# Patient Record
Sex: FEMALE | Race: WHITE
[De-identification: names, ages, dates, MRNs, and addresses within clinical notes are randomized per-mention and may not be internally consistent; named-entity substitution may affect disease eponyms.]

---

## 2017-11-15 ENCOUNTER — HOSPITAL ENCOUNTER (OUTPATIENT)
Dept: HOSPITAL 63 - MAMMO | Age: 62
Discharge: HOME | End: 2017-11-15
Payer: COMMERCIAL

## 2017-11-15 DIAGNOSIS — Z12.31: Primary | ICD-10-CM

## 2017-11-15 PROCEDURE — 77063 BREAST TOMOSYNTHESIS BI: CPT

## 2017-11-15 NOTE — RAD
DATE: 11/15/2017



EXAM: MAMMO DIDIER SCREENING BILATERAL Bilateral Screening Digital 2D and 3D

Mammogram



HISTORY: Screening Mammogram



COMPARISON: Screening mammogram 11/14/2016 11/11/2015 and 10/16/2014



This study was interpreted with the benefit of Computerized Aided Detection

(CAD).



The breast parenchyma shows scattered fibroglandular densities. Breast

parenchyma level B.



FINDINGS: Bilateral digital 2-D and 3-D tomosynthesis CC and MLO views. No

suspicious mass, calcification or architectural distortion. No significant

change from prior examination    



IMPRESSION: No mammographic evidence of malignancy. Recommend routine

screening mammogram in 12 months. 



BI-RADS CATEGORY: 1 NEGATIVE



RECOMMENDED FOLLOW-UP: 12M 12 MONTH FOLLOW-UP



PQRS compliance statement: Patient information was entered into a reminder

system with a target due date     for the next mammogram.



Mammography is a sensitive method for finding small breast cancers, but it

does not detect them all and is not a substitute for careful clinical

examination.  A negative mammogram does not negate a clinically suspicious

finding and should not result in delay in biopsying a clinically suspicious

abnormality.



"Our facility is accredited by the [employer] Mammography

Program."

## 2018-09-26 ENCOUNTER — HOSPITAL ENCOUNTER (OUTPATIENT)
Dept: HOSPITAL 61 - PNCL | Age: 63
Discharge: HOME | End: 2018-09-26
Attending: ANESTHESIOLOGY
Payer: COMMERCIAL

## 2018-09-26 DIAGNOSIS — M51.16: Primary | ICD-10-CM

## 2018-09-26 DIAGNOSIS — Z91.040: ICD-10-CM

## 2018-09-26 DIAGNOSIS — M47.816: ICD-10-CM

## 2018-09-26 DIAGNOSIS — M96.1: ICD-10-CM

## 2018-09-26 PROCEDURE — 62323 NJX INTERLAMINAR LMBR/SAC: CPT

## 2018-09-26 NOTE — PAIN
DATE OF SERVICE:  09/26/2018



DIAGNOSES:  Lumbar radiculopathy with lumbar spondylosis and post-lumbar

laminectomy syndrome.



HISTORY OF PRESENT ILLNESS:  The patient is a 62-year-old female who returns for

followup status post lumbar epidural steroid injections, last seen 10/2016.  The

patient did very well with her last injection, but reports the pain is returning

now in the low back and mainly in the right leg, posterior gluteus, posterior

thigh, posterior calf, worse with standing, walking, changing positions

described as stabbing, aching, burning, radiating, becoming more constant and

more severe and more unbearable.  The patient did have an MRI scan dated

07/30/2018 showing L5-S1 generalized disk bulge, slightly asymmetric to the

right and mild bilateral facet arthropathy and ligamentum flavum hypertrophy

without central stenosis.  L4-L5 shows generalized disk bulge and mild bilateral

facet arthropathy and ligamentum flavum hypertrophy as well with mild bilateral

neural foraminal stenosis.  The patient reports it is worse with walking,

standing, changing positions; awakens her from sleep about every 4 hours at

night.  The patient reports she can easily reposition and get back to sleep. 

She works no new motor or sensory deficits, no new bowel or bladder

incontinence, but still significant pain as described.



PHYSICAL EXAMINATION:

VITAL SIGNS:  The patient's blood pressure 146/95, pulse 81, respirations 20,

temperature is 98.2 degrees Fahrenheit, height is 5 feet 5 inches, weight is 156

pounds.

GENERAL:  She is awake, alert, oriented, appropriate, very pleasant demeanor.

HEENT:  Head shows normocephalic, atraumatic.  Extraocular muscles are intact

and symmetrical.  Oral cavity:  Mucous membranes moist and pink.  Dentition is

intact.

NECK:  Shows anterior throat supple without palpable lymphadenopathy noted. 

Swallow reflex is symmetrical.

CHEST:  Shows normal on inspection.  Breath sounds clear to auscultation

bilaterally.

HEART:  Shows S1, S2 clear.  No murmurs auscultated.

ABDOMEN:  Soft, nontender, nondistended.  No palpable organomegaly is noted.  No

rebound or guarding demonstrated.

BACK:  Shows spine grossly in the midline.  Normal appearing thoracic kyphosis

and some slight flattening of lumbar lordotic curvature.  Well-healed surgical

scar noted in the lumbar distribution.  Lumbar paraspinous musculature shows

symmetrical on inspection, on palpation shows some moderate tenderness

bilaterally but only diffusely without radiation.

EXTREMITIES:  The patient's lower extremities show deep tendon reflexes at 1+ in

the patellar and tendo calcaneus tendons.  Motor exam is strong with 5/5

dorsiflexion and extension, quadriceps and hamstring flexion equal bilaterally. 

Peripheral pulses are 1+ posterior tibial.  No peripheral edema is noted.



Options were discussed with the patient.  The patient's old chart was reviewed

as her current medication regimen and updated.  Current review of systems is

updated today as well.  We will proceed with a lumbar epidural steroid injection

today with fluoroscopic guidance.  Risks were again discussed including, but not

limited to bleeding, infection, possibility of epidural hematoma and subsequent

neurological compromise, dural punctures, headaches, spinal cord and/or nerve

damage, side effects of steroid medication and poor results regarding pain

control.  The patient understands and wished to proceed.  The patient to return

to clinic in approximately 2 weeks for followup.  She was counseled to return

appointment, activity level and side effects to be aware of.



DIAGNOSES:  Lumbar radiculopathy with lumbar spondylosis, lumbar

post-laminectomy syndrome and degenerative disk disease, lumbar.



PROCEDURE:  Lumbar epidural steroid injection, translaminar approach L5-S1 level

using C-arm fluoroscopic guidance under sterile prep and drape using local

anesthetic.



MEDICATION INJECTED:  A total of 120 mg of Depo-Medrol plus 10 mL of

preservative-free normal saline and 2 mL of Isovue for contrast.



CONDITION AT DISCHARGE:  Stable.  The patient tolerated procedure well, had no

complications.

 



______________________________

JERROD MILLER MD



DR:  ABDULLAHI/sharona  JOB#:  [id number] / [id number]

DD:  09/26/2018 09:06  DT:  09/26/2018 12:58

## 2018-10-10 ENCOUNTER — HOSPITAL ENCOUNTER (OUTPATIENT)
Dept: HOSPITAL 61 - PNCL | Age: 63
Discharge: HOME | End: 2018-10-10
Attending: ANESTHESIOLOGY
Payer: COMMERCIAL

## 2018-10-10 DIAGNOSIS — M96.1: ICD-10-CM

## 2018-10-10 DIAGNOSIS — M47.26: ICD-10-CM

## 2018-10-10 DIAGNOSIS — Z91.040: ICD-10-CM

## 2018-10-10 DIAGNOSIS — M51.16: Primary | ICD-10-CM

## 2018-10-10 PROCEDURE — 62323 NJX INTERLAMINAR LMBR/SAC: CPT

## 2018-10-10 NOTE — PAIN
DATE OF SERVICE:  10/10/2018



DIAGNOSES:  Lumbar radiculopathy with lumbar spondylosis and lumbar degenerative

disk disease and post-lumbar laminectomy syndrome.



HISTORY OF PRESENT ILLNESS:  The patient is a 63-year-old female who returns for

followup status post lumbar epidural steroid injection x 1.  The patient reports

about 50% improvement for the first week, and the pain has been returning

gradually in the low back, right greater than left lower extremity, posterior

gluteus, posterior thigh, radiating to the posterior lower leg occasionally,

worse with walking, standing, changing positions, better with sitting or lying

down, but awakens her from sleep occasionally, but only once about every 5 hours

or so.  Otherwise, she sleeps fairly well.  The patient reports pain is 8 on a

scale of 10 at its worst, average and least and is an 8 today.  Describes as

aching, sharp, shooting, stabbing, radiating, becoming more constant, more

severe, but again the first week doing much better, about 50%.  The patient has

increased activity, distance walking as well as doing household activities with

greater ease and comfort.



PHYSICAL EXAMINATION:

VITAL SIGNS:  Today, the patient's blood pressure 152/81, pulse is 64,

respirations 16, temperature 97.5 degrees Fahrenheit, height is 5 feet 5 inches

and weight is 154 pounds.

GENERAL:  The patient is awake, alert, oriented, appropriate, very pleasant

demeanor.

HEENT:  Head shows normocephalic, atraumatic.  Extraocular movements intact and

symmetrical.  Oral cavity:  Mucous membranes moist and pink.  Dentition is

intact.

NECK:  Shows anterior throat supple without palpable lymphadenopathy noted. 

Swallow reflex is symmetrical.

CHEST:  Shows normal with inspection.  Breath sounds clear to auscultation

bilaterally.

HEART:  Shows S1, S2 clear.  No murmurs auscultated.

ABDOMEN:  Soft, nontender, nondistended.  No palpable organomegaly is noted.  No

rebound or guarding demonstrated.

BACK:  Shows spine grossly in the midline.  Normal appearing thoracic kyphosis

and minor flattening of lumbar lordotic curvature.  Well-healed surgical scar

noted.  Lumbar distribution of the paraspinous muscle shows symmetrical on

inspection, on palpation shows some moderate tenderness but only diffusely

bilaterally without radiation.  No tenderness over the sacrum or sacroiliac

regions.  The patient has good rotational motion of the lumbar spine, both

laterally as well as extension and flexion without difficulty.

EXTREMITIES:  The patient's lower extremities show deep tendon reflexes 1+ in

the patellar and tendo calcaneus tendons are equal.  Motor exam is strong with

5/5 dorsiflexion, extension, quadriceps and hamstring flexion.  Peripheral

pulses are 1+ posterior tibia.  No peripheral edema is noted.



Options were discussed with the patient, the patient's old chart was reviewed as

her current medication regimen updated.  Current review of systems updated today

as well.  We will proceed with a second in the series of lumbar epidural steroid

injection today with fluoroscopic guidance.  Risks were again discussed

including, but not limited to bleeding, infection, possibility of epidural

hematoma, subsequent neurological compromise, dural puncture, headaches, spinal

cord and/or nerve damage, side effects of steroid medication and poor results

regarding pain control.  The patient understands and wished to proceed.  The

patient to return to clinic in approximately 2 weeks for followup, was counseled

on return appointment, activity level and side effects to be aware of.



DIAGNOSES:  Lumbar radiculopathy with lumbar spondylosis, lumbar degenerative

disk disease, post-lumbar laminectomy syndrome.



PROCEDURE:  Lumbar epidural steroid injection, translaminar approach at L5-S1

level using C-arm fluoroscopic guidance under sterile prep and drape using local

anesthetic.



MEDICATION INJECTED:  A total of 120 mg Depo-Medrol, plus 10 mL

preservative-free normal saline and 2 mL of Isovue for contrast.



CONDITION AT DISCHARGE:  Stable.  The patient tolerated procedure well, had no

complications.

 



______________________________

JERROD MILLER MD



DR:  ABDULLAHI/sharona  JOB#:  [id number] / [id number]

DD:  10/10/2018 08:52  DT:  10/10/2018 15:11

## 2018-10-24 ENCOUNTER — HOSPITAL ENCOUNTER (OUTPATIENT)
Dept: HOSPITAL 61 - PNCL | Age: 63
Discharge: HOME | End: 2018-10-24
Attending: ANESTHESIOLOGY
Payer: COMMERCIAL

## 2018-10-24 DIAGNOSIS — Z91.040: ICD-10-CM

## 2018-10-24 DIAGNOSIS — M96.1: ICD-10-CM

## 2018-10-24 DIAGNOSIS — M51.16: Primary | ICD-10-CM

## 2018-10-24 DIAGNOSIS — M47.26: ICD-10-CM

## 2018-10-24 PROCEDURE — 62323 NJX INTERLAMINAR LMBR/SAC: CPT

## 2018-10-25 NOTE — PAIN
DATE OF SERVICE:  10/24/2018



DIAGNOSES:  Lumbar radiculopathy with lumbar spondylosis, lumbar degenerative

disk disease and post-lumbar laminectomy syndrome.



HISTORY OF PRESENT ILLNESS:  The patient is a 63-year-old female who returns for

followup status post lumbar epidural steroid injection x 2.  The patient reports

about 70% improvement for the first week or so, but the pain returning in the

low back, bilateral lower extremities, mostly in the posterior gluteus,

posterior thighs, posterior calves, slightly worse on the right than the left

than it had been previously.  The patient reports it is an 8 on a scale of 10 at

its worst, on average and at its least and is an 8 today.  The patient reports

it is aching, sharp, shooting, stabbing, cramping, tingling, radiating, becoming

more constant, more severe, worse with walking, standing, change in positions,

awakens her from sleep about every 3-5 hours, but not every night.  The patient

reports no new motor or sensory deficits.  No new bowel or bladder incontinence

or other complaints.



PHYSICAL EXAMINATION:

VITAL SIGNS:  The patient's blood pressure is 144/102, pulse 81, respirations

16, temperature is 97.4 degrees Fahrenheit, height is 5 feet 5 inches, weight is

153 pounds.

GENERAL:  The patient is awake, alert, oriented, appropriate, very pleasant

demeanor.

HEENT:  Shows normocephalic, atraumatic.  Extraocular movements intact and

symmetrical.  Oral cavity:  Mucous membranes moist and pink.  Dentition is

intact.

NECK:  Shows anterior throat supple without palpable lymphadenopathy noted. 

Swallow reflex symmetrical.

CHEST:  Shows normal on inspection.  Breath sounds are clear to auscultation

bilaterally.

HEART:  Shows S1, S2 clear.  No murmurs auscultated.

ABDOMEN:  Soft, nontender, nondistended.  No palpable organomegaly is noted.  No

rebound or guarding demonstrated.

BACK:  Shows spine grossly in the midline.  Normal appearing thoracic kyphosis

and some minor flattening of lumbar lordotic curvature.  Lumbar paraspinous

muscle shows symmetrical on inspection.  With palpation shows some moderate

tenderness diffusely in the low lumbar distribution only.  The patient has

well-healed surgical scar in the lumbar spine _____ as well.

EXTREMITIES:  The patient's lower extremities show deep tendon reflexes about 1+

in the patellar and tendo calcaneus tendons.  Motor exam is strong with 5/5

dorsiflexion, extension, quadriceps and hamstring flexion and symmetrical. 

Peripheral pulses are 1+ posterior tibia.  No peripheral edema is noted.



Options were discussed with the patient.  The patient's old chart was reviewed

as is her current medication regimen updated.  Current review of systems is

updated today as well.  We will proceed with third in the series of lumbar

epidural steroid injection today with fluoroscopic guidance.  Risks were again

discussed including, but not limited to bleeding, infection, possibility of

epidural hematoma and subsequent neurological compromise, dural puncture,

headaches, spinal cord and/or nerve damage, side effects of steroid medication

and poor results regarding pain control.  The patient understands and wished to

proceed.  The patient will return to the clinic in approximately 2 weeks for

followup, was counseled on return appointment, activity level and side effects

to be aware of.



DIAGNOSES:  Lumbar radiculopathy with lumbar spondylosis, degenerative disk

disease and post-lumbar laminectomy syndrome.



PROCEDURE:  Lumbar epidural steroid injection, translaminar approach at L5-S1

using C-arm fluoroscopic guidance under sterile prep and drape using local

anesthetic.



MEDICATION INJECTED:  A total of 120 mg Depo-Medrol plus 10 mL of

preservative-free normal saline and 2 mL of Isovue for contrast.



CONDITION AT DISCHARGE:  Stable.  The patient tolerated procedure well, had no

complications.

 



______________________________

JERROD MILLER MD



DR:  ABDULLAHI/sharona  JOB#:  [id number] / [id number]

DD:  10/24/2018 09:50  DT:  10/25/2018 00:15

## 2018-11-14 ENCOUNTER — HOSPITAL ENCOUNTER (OUTPATIENT)
Dept: HOSPITAL 61 - PNCL | Age: 63
Discharge: HOME | End: 2018-11-14
Attending: ANESTHESIOLOGY
Payer: COMMERCIAL

## 2018-11-14 DIAGNOSIS — M96.1: ICD-10-CM

## 2018-11-14 DIAGNOSIS — M47.896: ICD-10-CM

## 2018-11-14 DIAGNOSIS — M51.16: Primary | ICD-10-CM

## 2018-11-14 PROCEDURE — 99212 OFFICE O/P EST SF 10 MIN: CPT

## 2018-11-14 NOTE — PAIN
DATE OF SERVICE:  11/14/2018



PROGRESS NOTE FOR PAIN CLINIC



DIAGNOSES:  Lumbar radiculopathy with lumbar spondylosis and lumbar degenerative

disk disease and post-lumbar laminectomy syndrome.



HISTORY OF PRESENT ILLNESS:  The patient is a 63-year-old female who returns for

followup status post lumbar epidural steroid injections, most recently seen on

10/24/2018.  The patient did well initially, but reports the pain is returning

now and has changed to some extent with no longer radiating to the lower

extremities, but staying in the low back, primarily right and left, essentially

equal.  The patient reports it is worse with walking, twisting, bending and even

prolonged sitting can bother.  It awakens her from sleep about every 4-5 hours. 

She can usually reposition and get back to sleep.  The patient reports pain is 8

on a scale of 10 at its worst, average and at its least and is an 8 today.  The

patient reports tingling, stabbing, aching, sharp, dull, radiating at some

times, constant, severe, shooting and becoming unbearable in the low back

itself.  It is worse with extension and axial loading of the lumbar spine as

well as right and left lateral rotation, she feels the pain as well.  The

patient reports no new motor or sensory deficits, no new bowel or bladder

incontinence or other complaints.



PHYSICAL EXAMINATION:

VITAL SIGNS:  The patient's blood pressure 149/87, pulse 87, respirations 18,

temperature 97.5 degrees Fahrenheit, height is 5 feet 5 inches, weighs 155

pounds.

GENERAL:  The patient is awake, alert, oriented, appropriate, very pleasant

demeanor.

HEENT:  Head shows normocephalic and atraumatic.  Extraocular movements are

intact and symmetrical.  Oral cavity:  Mucous membranes moist and pink. 

Dentition is intact.

NECK:  Shows anterior throat supple without palpable lymphadenopathy noted. 

Swallow reflex is symmetrical.

CHEST:  Shows normal with inspection.  Breath sounds are clear to auscultation

bilaterally.

HEART:  Shows S1, S2 clear.  No murmurs auscultated.

ABDOMEN:  Soft, nontender, nondistended.  No palpable organomegaly is noted.  No

rebound or guarding demonstrated.

BACK:  Shows spine grossly in the midline.  Normal appearing thoracic kyphosis

and lumbar lordotic curvature.  Lumbar paraspinous musculature shows symmetrical

on inspection.  Also, well-healed surgical scar noted.  On palpation, there is

some moderate tenderness, but only diffusely in the low lumbar distribution

without radiation.  The patient has good rotational motion, but with some

moderate tenderness in right and left, greater than 10 degrees as well as

significant pain with extension bilaterally, at better with decreased pain with

forward flexion at 45 degrees, which she performs without difficulty.

EXTREMITIES:  The patient's lower extremities show deep tendon reflexes 1+ in

the patellar and tendo-calcaneus tendons.  Motor examination is strong with 5/5

with dorsiflexion and extension.  Quadriceps and hamstring flexion symmetrical. 

Peripheral pulses are 1+ posterior tibial.  No peripheral edema is noted

bilaterally.



PLAN:  Options were discussed with the patient.  The patient's old chart was

reviewed as her current medication regimen updated.  Current review of systems

updated today as well.  We will try Medrol Dosepak.  In the meantime, she would

also like to consider lumbar facet joint injection.  We discussed these in some

detail as well as she does have some significant axial pain characteristics

especially with axial loading and extension of the lumbar spine with

exacerbation of the pain.  The patient would like to consider this.  We will let

her try the Medrol Dosepak as she does not feel well to have an injection this

morning and we will have her return in approximately 1 week and plan on

bilateral L4-L5 and L5-S1 facet joint injections at that time.

 



______________________________

JERROD MILLER MD



DR:  ABDULLAHI/sharona  JOB#:  [id number] / [id number]

DD:  11/14/2018 08:22  DT:  11/14/2018 17:02

## 2018-12-11 ENCOUNTER — HOSPITAL ENCOUNTER (OUTPATIENT)
Dept: HOSPITAL 63 - MAMMO | Age: 63
Discharge: HOME | End: 2018-12-11
Payer: COMMERCIAL

## 2018-12-11 DIAGNOSIS — Z12.31: Primary | ICD-10-CM

## 2018-12-11 PROCEDURE — 77067 SCR MAMMO BI INCL CAD: CPT

## 2018-12-11 PROCEDURE — 77063 BREAST TOMOSYNTHESIS BI: CPT

## 2018-12-11 NOTE — RAD
DATE: December 11, 2018



EXAM: MAMMO DIDIER SCREENING BILATERAL



HISTORY: Screening study.



COMPARISON: 2016 and 2017



This study was interpreted with the benefit of Computerized Aided Detection

(CAD).



2-D digital mammographic views of both breasts were performed in the CC and

MLO projections. 3-D digital tomosynthesis images of both breasts were

performed in the CC and MLO projections and reviewed on a computer

workstation.



FINDINGS:



Breast Density: SCATTERED The breast parenchyma shows scattered fibroglandular

densities. Breast parenchyma level B..  There are no dominant suspicious

masses, suspicious microcalcifications or evidence of architectural

distortion.





IMPRESSION: No mammographic indicators for malignancy.







BI-RADS CATEGORY: 1 NEGATIVE



RECOMMENDED FOLLOW-UP: 12M 12 MONTH FOLLOW-UP



PQRS compliance statement: Patient information was entered into a reminder

system with a target due date December 12, 2019 for the next mammogram.



Mammography is a sensitive method for finding small breast cancers, but it

does not detect them all and is not a substitute for careful clinical

examination.  A negative mammogram does not negate a clinically suspicious

finding and should not result in delay in biopsying a clinically suspicious

abnormality.



"Our facility is accredited by the [employer] Mammography

Program."



The patient's breast density may affect the ability of mammography to detect

breast cancer. There are 4 categories of breast density, A, B, C and D. Breast

density A means that most of the breast tissue is replaced with adipose tissue

and therefore is not dense. Breast density B means that the breast tissue is

mildly dense and scattered. Breast density C means that the breast tissue is

heterogeneously dense. Breast density D means that the breast tissue is very

dense. Breast densities especially C and D may decrease the sensitivity of

mammography to detect breast cancer. Therefore, the patient may benefit from

3-D breast mammography (3D breast tomography) as a part of their screening

mammogram. Insurance may or may not pay for this additional imaging. The

patient's breast density based on today's mammogram is category B.

## 2018-12-18 ENCOUNTER — HOSPITAL ENCOUNTER (OUTPATIENT)
Dept: HOSPITAL 63 - DXRAD | Age: 63
Discharge: HOME | End: 2018-12-18
Payer: COMMERCIAL

## 2018-12-18 DIAGNOSIS — Z13.820: Primary | ICD-10-CM

## 2018-12-18 DIAGNOSIS — Z78.0: ICD-10-CM

## 2018-12-18 DIAGNOSIS — Z82.62: ICD-10-CM

## 2018-12-18 PROCEDURE — 77080 DXA BONE DENSITY AXIAL: CPT

## 2019-12-18 ENCOUNTER — HOSPITAL ENCOUNTER (OUTPATIENT)
Dept: HOSPITAL 63 - MAMMO | Age: 64
Discharge: HOME | End: 2019-12-18
Payer: COMMERCIAL

## 2019-12-18 DIAGNOSIS — Z12.31: Primary | ICD-10-CM

## 2019-12-18 PROCEDURE — 77067 SCR MAMMO BI INCL CAD: CPT

## 2019-12-18 PROCEDURE — 77063 BREAST TOMOSYNTHESIS BI: CPT

## 2019-12-18 NOTE — RAD
DATE: December 18, 2019



EXAM: MAMMO DIDIER SCREENING BILATERAL



HISTORY: Screening study.



COMPARISON: 2017 and 2018



This study was interpreted with the benefit of Computerized Aided Detection

(CAD).



2-D digital mammographic views of both breasts were performed in the CC and

MLO projections. 3-D digital tomosynthesis images of both breasts were

performed in the CC and MLO projections and reviewed on a computer

workstation.



FINDINGS:



Breast Density: SCATTERED The breast parenchyma shows scattered fibroglandular

densities. Breast parenchyma level B..  There are no dominant suspicious

masses, suspicious microcalcifications or evidence of architectural

distortion.





IMPRESSION: No mammographic indicators for malignancy.







BI-RADS CATEGORY: 1 NEGATIVE



RECOMMENDED FOLLOW-UP: 12M 12 MONTH FOLLOW-UP



PQRS compliance statement: Patient information was entered into a reminder

system with a target due date December 19, 2020 for the next mammogram.



Mammography is a sensitive method for finding small breast cancers, but it

does not detect them all and is not a substitute for careful clinical

examination.  A negative mammogram does not negate a clinically suspicious

finding and should not result in delay in biopsying a clinically suspicious

abnormality.



"Our facility is accredited by the [employer] Mammography

Program."



The patient's breast density may affect the ability of mammography to detect

breast cancer. There are 4 categories of breast density, A, B, C and D. Breast

density A means that most of the breast tissue is replaced with adipose tissue

and therefore is not dense. Breast density B means that the breast tissue is

mildly dense and scattered. Breast density C means that the breast tissue is

heterogeneously dense. Breast density D means that the breast tissue is very

dense. Breast densities especially C and D may decrease the sensitivity of

mammography to detect breast cancer. Therefore, the patient may benefit from

3-D breast mammography (3D breast tomography) as a part of their screening

mammogram. Insurance may or may not pay for this additional imaging. The

patient's breast density based on today's mammogram is category B.

## 2020-12-22 ENCOUNTER — HOSPITAL ENCOUNTER (OUTPATIENT)
Dept: HOSPITAL 63 - MAMMO | Age: 65
End: 2020-12-22
Payer: MEDICARE

## 2020-12-22 DIAGNOSIS — Z12.31: Primary | ICD-10-CM

## 2020-12-22 PROCEDURE — 77063 BREAST TOMOSYNTHESIS BI: CPT

## 2020-12-22 PROCEDURE — 77067 SCR MAMMO BI INCL CAD: CPT

## 2020-12-23 NOTE — RAD
DATE: 12/22/2020 1:20 PM



EXAM: MAMMO DIDIER SCREENING BILATERAL



HISTORY:  Screening



COMPARISON: 12/18/2019



Bilateral CC and MLO views of the breasts were performed. Bilateral breast

tomosynthesis was performed in CC and MLO projections.



This study was interpreted with the benefit of Computerized Aided Detection

(CAD).





FINDINGS:



Breast Density: SCATTERED  The breast parenchyma shows scattered

fibroglandular densities. Breast parenchyma level B





No suspicious masses, microcalcifications or architectural distortion is

present to suggest malignancy in either breast.





The visualized axillae are unremarkable. 



IMPRESSION: No mammographic evidence of malignancy. 



BI-RADS CATEGORY: 1 NEGATIVE



RECOMMENDED FOLLOW-UP: 12M 12 MONTH FOLLOW-UP Annual screening mammography is

recommended, unless clinically indicated sooner based on symptoms or change in

physical exam.



PQRS compliance statement: Patient information was entered into a reminder

system with a target due date for the next mammogram.



Mammography is a sensitive method for finding small breast cancers, but it

does not detect them all and is not a substitute for careful clinical

examination.  A negative mammogram does not negate a clinically suspicious

finding and should not result in delay in biopsying a clinically suspicious

abnormality.



"Our facility is accredited by the [employer] Mammography

Program."

## 2021-06-08 ENCOUNTER — HOSPITAL ENCOUNTER (OUTPATIENT)
Dept: HOSPITAL 63 - SURG | Age: 66
Discharge: HOME | End: 2021-06-08
Attending: INTERNAL MEDICINE
Payer: MEDICARE

## 2021-06-08 VITALS
SYSTOLIC BLOOD PRESSURE: 146 MMHG | DIASTOLIC BLOOD PRESSURE: 71 MMHG | DIASTOLIC BLOOD PRESSURE: 71 MMHG | SYSTOLIC BLOOD PRESSURE: 146 MMHG

## 2021-06-08 DIAGNOSIS — I10: ICD-10-CM

## 2021-06-08 DIAGNOSIS — Z82.62: ICD-10-CM

## 2021-06-08 DIAGNOSIS — Z12.11: Primary | ICD-10-CM

## 2021-06-08 DIAGNOSIS — Z80.0: ICD-10-CM

## 2021-06-08 DIAGNOSIS — Z88.8: ICD-10-CM

## 2021-06-08 DIAGNOSIS — Z85.3: ICD-10-CM

## 2021-06-08 DIAGNOSIS — E78.00: ICD-10-CM

## 2021-06-08 DIAGNOSIS — Z90.710: ICD-10-CM

## 2021-06-08 DIAGNOSIS — Z86.010: ICD-10-CM

## 2021-06-08 DIAGNOSIS — Z98.890: ICD-10-CM

## 2021-06-08 DIAGNOSIS — Z79.899: ICD-10-CM

## 2021-06-08 DIAGNOSIS — Z91.040: ICD-10-CM

## 2021-06-08 DIAGNOSIS — Z83.71: ICD-10-CM

## 2021-06-08 DIAGNOSIS — Z72.89: ICD-10-CM

## 2021-06-08 PROCEDURE — 45378 DIAGNOSTIC COLONOSCOPY: CPT

## 2021-12-23 ENCOUNTER — HOSPITAL ENCOUNTER (OUTPATIENT)
Dept: HOSPITAL 63 - MAMMO | Age: 66
End: 2021-12-23
Payer: MEDICARE

## 2021-12-23 DIAGNOSIS — Z12.31: Primary | ICD-10-CM

## 2021-12-23 PROCEDURE — 77067 SCR MAMMO BI INCL CAD: CPT

## 2025-05-08 NOTE — RAD
Bilateral wrist restraints applied for patient safety during procedure.  Bone mineral density exam

 

History: Osteoporosis screening, hysterectomy

 

Comparison:  None

 

Findings:  Bone mineral density examination utilizing DEXA was performed.

 

Total right femur bone mineral density of 844 mg/cm2 corresponds with a T 

score -0.9, Z score 0.0. However right femoral neck bone mineral density 

of 678 mg/cm2 corresponds with a T score -2.1 and a Z score -0.9.

 

The bone mineral density of the lumbar spine was 1.261 g/cm2 which 

corresponds with a T-score of 0.7, Z score 1.9. 

 

By [employer] on Osteoporosis criteria, a T score of 0 to-1 SD is 

considered to be within normal limits.  A T score of -1 to -2.5 SD is 

considered osteopenia.  A T score less than -2.5 SD is considered 

osteoporosis

 

Impression:

 

1. While the total right femoral bone mineral density is considered within

normal limits, right femoral neck bone mineral density corresponds with 

osteopenia. There is normal bone density of the lumbar spine.

 

Electronically signed by: Efrain Egan MD (12/19/2018 8:18 AM) 

[id number]